# Patient Record
Sex: FEMALE | URBAN - METROPOLITAN AREA
[De-identification: names, ages, dates, MRNs, and addresses within clinical notes are randomized per-mention and may not be internally consistent; named-entity substitution may affect disease eponyms.]

---

## 2019-12-06 ENCOUNTER — OFFICE VISIT (OUTPATIENT)
Dept: CARDIOLOGY CLINIC | Age: 29
End: 2019-12-06

## 2019-12-06 VITALS
HEART RATE: 85 BPM | SYSTOLIC BLOOD PRESSURE: 125 MMHG | BODY MASS INDEX: 21.82 KG/M2 | DIASTOLIC BLOOD PRESSURE: 81 MMHG | WEIGHT: 139 LBS | OXYGEN SATURATION: 100 % | HEIGHT: 67 IN

## 2019-12-06 DIAGNOSIS — Z98.890 H/O CARDIAC RADIOFREQUENCY ABLATION: ICD-10-CM

## 2019-12-06 DIAGNOSIS — R07.9 CHEST PAIN, UNSPECIFIED TYPE: ICD-10-CM

## 2019-12-06 DIAGNOSIS — R07.89 CHEST DISCOMFORT: ICD-10-CM

## 2019-12-06 DIAGNOSIS — R00.2 PALPITATIONS: Primary | ICD-10-CM

## 2019-12-06 DIAGNOSIS — I49.3 PVC (PREMATURE VENTRICULAR CONTRACTION): ICD-10-CM

## 2019-12-06 NOTE — PATIENT INSTRUCTIONS
Echo and Nuclear Stress-  Please call central scheduling at [phone]      All testing/lab work is completed at [facility], [facility]

## 2019-12-11 PROBLEM — Z98.890 H/O CARDIAC RADIOFREQUENCY ABLATION: Status: ACTIVE | Noted: 2019-12-11

## 2019-12-11 PROBLEM — R00.2 PALPITATIONS: Status: ACTIVE | Noted: 2019-12-11

## 2019-12-11 PROBLEM — R07.89 CHEST DISCOMFORT: Status: ACTIVE | Noted: 2019-12-11

## 2019-12-14 NOTE — PROGRESS NOTES
Subjective:      Musa Marshall is in the office for cardiac evaluation. She is a 26-year-old woman with a history of prior arrhythmia ablation done in 2013. One of the ablations was done at [facility] in [place], [place]. The second ablation was done in [place]. None of those records are available at present. The patient is anticipating starting a family soon and wants to be certain her cardiac status is stable. Patient reports that over the last couple of months, she has had some episodic chest pain and palpitations. The chest discomfort quality is described as a tightness or little pain. The discomfort is also described as a stabbing pain or sticking pain. The discomfort does not exclusively occur with exertion. .  She describes her palpitations as a \"fast heartbeat \". .  The palpitations have occurred intermittently and are random with no inciting factors. She has had no recent near-syncope or syncope. Patient Active Problem List    Diagnosis Date Noted    Chest discomfort 12/11/2019    Palpitations 12/11/2019    H/O cardiac radiofrequency ablation 12/11/2019       No Known Allergies  Past Medical History:   Diagnosis Date    Bigeminy     PVC (premature ventricular contraction)      No past surgical history on file. No family history on file.   Social History     Tobacco Use   Smoking Status Never Smoker   Smokeless Tobacco Never Used          Review of Systems, additional:  Constitutional: negative  Eyes: negative  Respiratory: negative  Cardiovascular: positive for chest pressure/discomfort, palpitations  Gastrointestinal: negative  Musculoskeletal:negative  Neurological: negative  Behvioral/Psych: negative  Endocrine: negative  ENT: negative    Objective:     Visit Vitals  /81   Pulse 85   Ht 5' 7\" (1.702 m)   Wt 63 kg (139 lb)   SpO2 100%   BMI 21.77 kg/m²     General:  alert, cooperative, no distress   Chest Wall: inspection normal - no chest wall deformities or tenderness, respiratory effort normal   Lung: clear to auscultation bilaterally   Heart:  normal rate and regular rhythm, S1 and S2 normal, no murmurs noted, no gallops noted, no JVD   Abdomen: soft, non-tender. Bowel sounds normal. No masses,  no organomegaly   Extremities: extremities normal, atraumatic, no cyanosis or edema Skin: no rashes   Neuro: alert, oriented, normal speech, no focal findings or movement disorder noted     EK2019. Sinus rhythm. Normal.    Assessment/Plan:       ICD-10-CM ICD-9-CM    1. Palpitations, will order echocardiogram and routine treadmill stress test.  Obtain records. Return to in 3 weeks. R00.2 785.1 AMB POC EKG ROUTINE W/ 12 LEADS, INTER & REP      ECHO ADULT COMPLETE      EXERCISE CARDIAC STRESS TEST          2. Chest pain, unspecified type R07.9 786.50 ECHO ADULT COMPLETE      EXERCISE CARDIAC STRESS TEST   3. Chest discomfort R07.89 786.59    4. H/O cardiac radiofrequency ablation, , presumptive diagnosis with records pending.  Z98.890 V15.1

## 2020-01-23 ENCOUNTER — TELEPHONE (OUTPATIENT)
Dept: CARDIOLOGY CLINIC | Age: 30
End: 2020-01-23

## 2020-01-23 NOTE — TELEPHONE ENCOUNTER
Attempted to contact pt at  number, no answer. Lvm for pt to return call to office at [phone]. Will continue to try to contact pt.        Re: need to reschedule appt for tomorrow no testing completed